# Patient Record
(demographics unavailable — no encounter records)

---

## 2024-12-17 NOTE — PLAN
[FreeTextEntry1] : 65YO FEMALE PRESENTS FOR ANNUAL GYN EXAMINATION. SELF BREAST EXAMINATION TAUGHT. MAMMOGRAM ORDERED- RIGHT NIPPLE INVERSION NORMAL FOR PT PER HER REPORT. EXERCISE, CALCIUM AND VITAMIN D3 SUPPLEMENTATION DISCUSSED. BONE DENSITY STUDY AND INDICATIONS REVIEWED. COLONOSCOPY HISTORY REVIEWED AND DISCUSSED FOLLOWUP IN 1 YR OR PRN.

## 2024-12-17 NOTE — PHYSICAL EXAM
[Chaperone Declined] : Patient declined chaperone [Appropriately responsive] : appropriately responsive [Alert] : alert [No Acute Distress] : no acute distress [Soft] : soft [Non-tender] : non-tender [Non-distended] : non-distended [No HSM] : No HSM [No Lesions] : no lesions [No Mass] : no mass [Oriented x3] : oriented x3 [Breast Nipple Inversion Right] : inverted [Labia Majora] : normal [Labia Minora] : normal [Normal] : normal [Uterine Adnexae] : normal

## 2024-12-17 NOTE — HISTORY OF PRESENT ILLNESS
[FreeTextEntry1] : 65 YO F PRESENTS FOR ANNUAL VISIT. PT FEELS WELL AND OFFERS NO COMPLAINTS. BROTHER  IN SEPTEMBER FROM BRAIN TUMOR.   HX SCOLIOSIS WITH SPINAL FUSION IN EARLY 20'S MENOPAUSE SINCE    LPAP 12/15/23: NEGATIVE LMAMMO 23: BIRADS-2 LDEXA: HAS NOT HAD DONE D/T JAYLON IN BACK 2/2 SCOLIOSIS COLONOSCOPY DONE THIS YEAR AND WNL PER PT